# Patient Record
Sex: FEMALE | Race: WHITE
[De-identification: names, ages, dates, MRNs, and addresses within clinical notes are randomized per-mention and may not be internally consistent; named-entity substitution may affect disease eponyms.]

---

## 2020-09-23 ENCOUNTER — HOSPITAL ENCOUNTER (OUTPATIENT)
Dept: HOSPITAL 92 - BICULT | Age: 85
Discharge: HOME | End: 2020-09-23
Attending: INTERNAL MEDICINE
Payer: MEDICARE

## 2020-09-23 DIAGNOSIS — N17.9: Primary | ICD-10-CM

## 2020-09-23 PROCEDURE — 76770 US EXAM ABDO BACK WALL COMP: CPT

## 2020-09-23 NOTE — ULT
BILATERAL RENAL ULTRASOUND:

 

Date:   09/23/2020

 

HISTORY:  

Acute kidney failure. 

 

FINDINGS:

Right kidney measures 8.1 cm in length and left kidney measures 8.2 cm in length. No focal mass or hy
dronephrosis seen on either side. Urinary bladder is unremarkable. 

 

IMPRESSION: 

No evidence of high grade obstruction. 

 

 

POS: AH